# Patient Record
Sex: FEMALE | Race: WHITE | NOT HISPANIC OR LATINO | Employment: FULL TIME | URBAN - METROPOLITAN AREA
[De-identification: names, ages, dates, MRNs, and addresses within clinical notes are randomized per-mention and may not be internally consistent; named-entity substitution may affect disease eponyms.]

---

## 2023-05-30 ENCOUNTER — TELEMEDICINE (OUTPATIENT)
Dept: PRIMARY CARE | Facility: CLINIC | Age: 38
End: 2023-05-30
Payer: COMMERCIAL

## 2023-05-30 DIAGNOSIS — K50.919 CROHN'S DISEASE WITH COMPLICATION, UNSPECIFIED GASTROINTESTINAL TRACT LOCATION (MULTI): ICD-10-CM

## 2023-05-30 DIAGNOSIS — U07.1 COVID-19: ICD-10-CM

## 2023-05-30 DIAGNOSIS — U07.1 COVID-19: Primary | ICD-10-CM

## 2023-05-30 PROBLEM — R56.9 SEIZURE (MULTI): Status: ACTIVE | Noted: 2023-05-30

## 2023-05-30 PROBLEM — K50.90 CROHN'S RELATED ARTHRITIS (MULTI): Status: ACTIVE | Noted: 2023-05-30

## 2023-05-30 PROBLEM — F41.9 ANXIETY: Status: ACTIVE | Noted: 2023-05-30

## 2023-05-30 PROBLEM — E53.8 VITAMIN B12 DEFICIENCY: Status: ACTIVE | Noted: 2023-05-30

## 2023-05-30 PROBLEM — F98.8 ADD (ATTENTION DEFICIT DISORDER): Status: ACTIVE | Noted: 2023-05-30

## 2023-05-30 PROBLEM — M07.60 CROHN'S RELATED ARTHRITIS (MULTI): Status: ACTIVE | Noted: 2023-05-30

## 2023-05-30 PROBLEM — I95.1 DYSAUTONOMIA ORTHOSTATIC HYPOTENSION SYNDROME: Status: ACTIVE | Noted: 2023-05-30

## 2023-05-30 PROCEDURE — 99213 OFFICE O/P EST LOW 20 MIN: CPT | Performed by: FAMILY MEDICINE

## 2023-05-30 RX ORDER — LAMOTRIGINE 150 MG/1
1 TABLET ORAL DAILY
COMMUNITY

## 2023-05-30 RX ORDER — ARIPIPRAZOLE 400 MG
400 KIT INTRAMUSCULAR
COMMUNITY
Start: 2023-05-04

## 2023-05-30 ASSESSMENT — ENCOUNTER SYMPTOMS
SHORTNESS OF BREATH: 1
CHILLS: 0
RHINORRHEA: 1
SWEATS: 0
MYALGIAS: 0
HEADACHES: 0
COUGH: 1
FEVER: 1
HEARTBURN: 0
HEMOPTYSIS: 0
WEIGHT LOSS: 0
SORE THROAT: 0
WHEEZING: 0

## 2023-05-30 NOTE — PROGRESS NOTES
Subjective   Patient ID: Erendira Ruiz is a 37 y.o. female who presents for covid 19.    Got sick 2 days ago   went to ER and gave cough med  Reviewed note   This visit was complete by telephone due to the restrictions of the COVID-19 pandemic and patient unable to complete using video. Consent was given for the visit. The patients identity was confirmed.  All issues as below were discussed and addressed but no physical exam was performed. If it was felt that the patient should be evaluated in the clinic then they were directed there. The patient verbally consented to visit. Spent 10  minutes with patient and 15  minutes total.       Cough  This is a new problem. The current episode started in the past 7 days. The problem has been unchanged. The problem occurs constantly. The cough is Non-productive. Associated symptoms include chest pain, a fever, nasal congestion, rhinorrhea and shortness of breath. Pertinent negatives include no chills, ear congestion, ear pain, headaches, heartburn, hemoptysis, myalgias, postnasal drip, rash, sore throat, sweats, weight loss or wheezing. Nothing aggravates the symptoms.        Review of Systems   Constitutional:  Positive for fever. Negative for chills and weight loss.   HENT:  Positive for rhinorrhea. Negative for ear pain, postnasal drip and sore throat.    Respiratory:  Positive for cough and shortness of breath. Negative for hemoptysis and wheezing.    Cardiovascular:  Positive for chest pain.   Gastrointestinal:  Negative for heartburn.   Musculoskeletal:  Negative for myalgias.   Skin:  Negative for rash.   Neurological:  Negative for headaches.       Objective   There were no vitals taken for this visit.    Physical Exam    Assessment/Plan   Problem List Items Addressed This Visit    None

## 2023-06-09 ENCOUNTER — OFFICE VISIT (OUTPATIENT)
Dept: PRIMARY CARE | Facility: CLINIC | Age: 38
End: 2023-06-09
Payer: COMMERCIAL

## 2023-06-09 VITALS
HEIGHT: 64 IN | SYSTOLIC BLOOD PRESSURE: 90 MMHG | DIASTOLIC BLOOD PRESSURE: 56 MMHG | BODY MASS INDEX: 28.42 KG/M2 | TEMPERATURE: 97.2 F | RESPIRATION RATE: 20 BRPM | WEIGHT: 166.5 LBS | HEART RATE: 78 BPM

## 2023-06-09 DIAGNOSIS — R56.9 SEIZURE (MULTI): ICD-10-CM

## 2023-06-09 DIAGNOSIS — F31.9 BIPOLAR I DISORDER (MULTI): ICD-10-CM

## 2023-06-09 DIAGNOSIS — J01.90 ACUTE NON-RECURRENT SINUSITIS, UNSPECIFIED LOCATION: Primary | ICD-10-CM

## 2023-06-09 DIAGNOSIS — I95.1 DYSAUTONOMIA ORTHOSTATIC HYPOTENSION SYNDROME: ICD-10-CM

## 2023-06-09 PROCEDURE — 99213 OFFICE O/P EST LOW 20 MIN: CPT | Performed by: FAMILY MEDICINE

## 2023-06-09 PROCEDURE — 1036F TOBACCO NON-USER: CPT | Performed by: FAMILY MEDICINE

## 2023-06-09 RX ORDER — AMOXICILLIN 500 MG/1
1000 TABLET, FILM COATED ORAL EVERY 12 HOURS
Qty: 40 TABLET | Refills: 0 | Status: SHIPPED | OUTPATIENT
Start: 2023-06-09 | End: 2023-06-19

## 2023-06-09 ASSESSMENT — ENCOUNTER SYMPTOMS: COUGH: 1

## 2023-06-09 NOTE — PROGRESS NOTES
"Subjective   Patient ID: Erendira Ruiz is a 37 y.o. female who presents for Cough (With congestion, s/p covid 11 days ago).    Occasional green  PND     URI   This is a new problem. The current episode started 1 to 4 weeks ago. The problem has been waxing and waning. There has been no fever. The fever has been present for 1 to 2 days. Associated symptoms include congestion and coughing.        Review of Systems   HENT:  Positive for congestion.    Respiratory:  Positive for cough.        Objective   BP 90/56 (BP Location: Right arm, Patient Position: Sitting, BP Cuff Size: Adult)   Pulse 78   Temp 36.2 °C (97.2 °F) (Temporal)   Resp 20   Ht 1.626 m (5' 4\")   Wt 75.5 kg (166 lb 8 oz)   BMI 28.58 kg/m²     Physical Exam  Vitals and nursing note reviewed.   Constitutional:       General: She is not in acute distress.     Appearance: Normal appearance. She is not ill-appearing.   HENT:      Head: Normocephalic and atraumatic.      Right Ear: Tympanic membrane, ear canal and external ear normal. Tympanic membrane is not perforated, erythematous, retracted or bulging.      Left Ear: Tympanic membrane, ear canal and external ear normal. Tympanic membrane is not perforated, erythematous, retracted or bulging.      Nose: Congestion present.      Mouth/Throat:      Mouth: Mucous membranes are moist.      Pharynx: Oropharynx is clear.   Eyes:      General:         Right eye: No hordeolum.         Left eye: No hordeolum.      Conjunctiva/sclera: Conjunctivae normal.      Right eye: Right conjunctiva is not injected. No exudate.     Left eye: Left conjunctiva is not injected. No exudate.  Cardiovascular:      Rate and Rhythm: Normal rate and regular rhythm.   Pulmonary:      Effort: Pulmonary effort is normal. No tachypnea.      Breath sounds: Normal breath sounds. No decreased air movement.   Lymphadenopathy:      Cervical:      Right cervical: No superficial cervical adenopathy.     Left cervical: No superficial " cervical adenopathy.   Skin:     General: Skin is warm.      Findings: No rash.   Neurological:      Mental Status: She is alert.   Psychiatric:         Mood and Affect: Mood and affect normal.         Assessment/Plan   Problem List Items Addressed This Visit          Nervous    Seizure (CMS/HCC)     Stable and will monitor at least yearly.  No recent seizure             Other    Bipolar I disorder (CMS/HCC) (Chronic)     Seeing psych         Dysautonomia orthostatic hypotension syndrome (CMS/HCC)     Stable and will monitor at least yearly.

## 2023-12-12 ENCOUNTER — OFFICE VISIT (OUTPATIENT)
Dept: PRIMARY CARE | Facility: CLINIC | Age: 38
End: 2023-12-12
Payer: COMMERCIAL

## 2023-12-12 VITALS
HEART RATE: 78 BPM | RESPIRATION RATE: 16 BRPM | BODY MASS INDEX: 30.05 KG/M2 | SYSTOLIC BLOOD PRESSURE: 114 MMHG | WEIGHT: 176 LBS | TEMPERATURE: 97.8 F | HEIGHT: 64 IN | DIASTOLIC BLOOD PRESSURE: 70 MMHG

## 2023-12-12 DIAGNOSIS — J18.9 PNEUMONIA OF LEFT LOWER LOBE DUE TO INFECTIOUS ORGANISM: Primary | ICD-10-CM

## 2023-12-12 PROCEDURE — 99212 OFFICE O/P EST SF 10 MIN: CPT | Performed by: FAMILY MEDICINE

## 2023-12-12 PROCEDURE — 1036F TOBACCO NON-USER: CPT | Performed by: FAMILY MEDICINE

## 2023-12-12 NOTE — PROGRESS NOTES
"Subjective   Patient ID: Erendira Ruiz is a 38 y.o. female who presents for Flu Symptoms (Patient here for F/U from [facility] for flu symptoms. Patient was there last week and then a couple days ago for pneumonia.  ) and Pneumonia.    Got sick 2 weeks ago with the flu, no feverr past week    went to ER on Sunday and chest xray shows atelectasis vs early pneumonia and is feeling better   got z wolfgang   Is feeling better          Review of Systems    Objective   /70   Pulse 78   Temp 36.6 °C (97.8 °F)   Resp 16   Ht 1.626 m (5' 4\")   Wt 79.8 kg (176 lb)   BMI 30.21 kg/m²     Physical Exam  Vitals and nursing note reviewed.   Constitutional:       General: She is not in acute distress.     Appearance: Normal appearance. She is not ill-appearing.   HENT:      Head: Normocephalic and atraumatic.      Right Ear: Tympanic membrane, ear canal and external ear normal. Tympanic membrane is not perforated, erythematous, retracted or bulging.      Left Ear: Tympanic membrane, ear canal and external ear normal. Tympanic membrane is not perforated, erythematous, retracted or bulging.      Mouth/Throat:      Mouth: Mucous membranes are moist.      Pharynx: Oropharynx is clear.   Eyes:      General:         Right eye: No hordeolum.         Left eye: No hordeolum.      Conjunctiva/sclera: Conjunctivae normal.      Right eye: Right conjunctiva is not injected. No exudate.     Left eye: Left conjunctiva is not injected. No exudate.  Cardiovascular:      Rate and Rhythm: Normal rate and regular rhythm.   Pulmonary:      Effort: Pulmonary effort is normal. No tachypnea.      Breath sounds: Normal breath sounds. No decreased air movement.   Lymphadenopathy:      Cervical:      Right cervical: No superficial cervical adenopathy.     Left cervical: No superficial cervical adenopathy.   Skin:     General: Skin is warm.      Findings: No rash.   Neurological:      Mental Status: She is alert.   Psychiatric:         Mood and " Affect: Mood and affect normal.         Assessment/Plan   Problem List Items Addressed This Visit    None  Visit Diagnoses         Codes    Pneumonia of left lower lobe due to infectious organism    -  Primary J18.9

## 2024-10-08 ENCOUNTER — APPOINTMENT (OUTPATIENT)
Dept: PRIMARY CARE | Facility: CLINIC | Age: 39
End: 2024-10-08
Payer: COMMERCIAL

## 2024-10-08 VITALS
DIASTOLIC BLOOD PRESSURE: 74 MMHG | WEIGHT: 178 LBS | BODY MASS INDEX: 29.66 KG/M2 | TEMPERATURE: 97.5 F | SYSTOLIC BLOOD PRESSURE: 110 MMHG | HEIGHT: 65 IN | RESPIRATION RATE: 16 BRPM | OXYGEN SATURATION: 98 % | HEART RATE: 76 BPM

## 2024-10-08 DIAGNOSIS — F31.9 BIPOLAR I DISORDER (MULTI): Primary | Chronic | ICD-10-CM

## 2024-10-08 DIAGNOSIS — Z23 NEED FOR INFLUENZA VACCINATION: ICD-10-CM

## 2024-10-08 DIAGNOSIS — Z76.89 ENCOUNTER TO ESTABLISH CARE: ICD-10-CM

## 2024-10-08 DIAGNOSIS — F42.2 MIXED OBSESSIONAL THOUGHTS AND ACTS: ICD-10-CM

## 2024-10-08 DIAGNOSIS — F22 PARANOIA (MULTI): ICD-10-CM

## 2024-10-08 DIAGNOSIS — K50.819 CROHN'S DISEASE OF SMALL AND LARGE INTESTINES WITH COMPLICATION (MULTI): ICD-10-CM

## 2024-10-08 PROBLEM — N83.202 OVARIAN CYST, LEFT: Status: ACTIVE | Noted: 2022-06-14

## 2024-10-08 PROBLEM — R56.9 SEIZURE (MULTI): Status: RESOLVED | Noted: 2023-05-30 | Resolved: 2024-10-08

## 2024-10-08 PROBLEM — Z93.9 HISTORY OF CREATION OF OSTOMY (MULTI): Status: RESOLVED | Noted: 2024-10-08 | Resolved: 2024-10-08

## 2024-10-08 PROBLEM — I95.1 DYSAUTONOMIA ORTHOSTATIC HYPOTENSION SYNDROME: Status: RESOLVED | Noted: 2023-05-30 | Resolved: 2024-10-08

## 2024-10-08 PROCEDURE — 1036F TOBACCO NON-USER: CPT | Performed by: NURSE PRACTITIONER

## 2024-10-08 PROCEDURE — 90471 IMMUNIZATION ADMIN: CPT | Performed by: NURSE PRACTITIONER

## 2024-10-08 PROCEDURE — 90656 IIV3 VACC NO PRSV 0.5 ML IM: CPT | Performed by: NURSE PRACTITIONER

## 2024-10-08 PROCEDURE — 99214 OFFICE O/P EST MOD 30 MIN: CPT | Performed by: NURSE PRACTITIONER

## 2024-10-08 PROCEDURE — 3008F BODY MASS INDEX DOCD: CPT | Performed by: NURSE PRACTITIONER

## 2024-10-08 RX ORDER — LEVONORGESTREL 52 MG/1
1 INTRAUTERINE DEVICE INTRAUTERINE
COMMUNITY

## 2024-10-08 RX ORDER — ACETAMINOPHEN, DEXTROMETHORPHAN HBR, DOXYLAMINE SUCCINATE, PHENYLEPHRINE HCL 650; 20; 12.5; 1 MG/30ML; MG/30ML; MG/30ML; MG/30ML
1 SOLUTION ORAL DAILY
COMMUNITY
Start: 2021-03-29

## 2024-10-08 RX ORDER — ONDANSETRON 8 MG/1
8 TABLET, ORALLY DISINTEGRATING ORAL EVERY 6 HOURS PRN
COMMUNITY
Start: 2024-08-16

## 2024-10-08 ASSESSMENT — ENCOUNTER SYMPTOMS
SHORTNESS OF BREATH: 0
COUGH: 0
FATIGUE: 0
SLEEP DISTURBANCE: 0
NAUSEA: 0
AGITATION: 0
NERVOUS/ANXIOUS: 0
FEVER: 0
CONSTIPATION: 0
DIARRHEA: 0
VOMITING: 0
WEAKNESS: 0

## 2024-10-08 NOTE — PROGRESS NOTES
"Subjective   Patient ID: Erendira Ruiz is a 38 y.o. female who presents for Flu Symptoms (ED follow up , Negative Covid ./Symptoms improved  ) and Establish Care.    She is here for follow up and establish care. She feels much better, she had a URI viral most likely, COVID and influenza negative and she was evaluated in ED. PMH of Bipolar and paranoia, follows with psychiatry. History of chron's and did have an ostomy years ago, follows with GI. Also family history of psychiatric disorders, including bipolar with psychosis.   She is current upset about her medical records stating they are not correct and they keep changing the diagnoses and allergies. She reports she takes her medication as prescribed    Recent blood work and records reviewed         Review of Systems   Constitutional:  Negative for fatigue and fever.   Respiratory:  Negative for cough and shortness of breath.    Cardiovascular:  Negative for chest pain and leg swelling.   Gastrointestinal:  Negative for constipation, diarrhea, nausea and vomiting.   Skin:  Negative for pallor and rash.   Neurological:  Negative for weakness.   Psychiatric/Behavioral:  Negative for agitation, behavioral problems and sleep disturbance. The patient is not nervous/anxious.        Objective   /74 (BP Location: Left arm, Patient Position: Sitting)   Pulse 76   Temp 36.4 °C (97.5 °F)   Resp 16   Ht 1.651 m (5' 5\")   Wt 80.7 kg (178 lb)   SpO2 98%   BMI 29.62 kg/m²     Physical Exam  Vitals and nursing note reviewed.   Constitutional:       General: She is not in acute distress.  HENT:      Head: Normocephalic and atraumatic.   Eyes:      Pupils: Pupils are equal, round, and reactive to light.   Cardiovascular:      Rate and Rhythm: Normal rate and regular rhythm.   Pulmonary:      Effort: Pulmonary effort is normal.      Breath sounds: Normal breath sounds.   Skin:     General: Skin is warm and dry.   Neurological:      Mental Status: She is alert and " oriented to person, place, and time.   Psychiatric:         Mood and Affect: Mood normal.      Comments: Some paranoia thoughts, OCD thoughts         Assessment/Plan   Problem List Items Addressed This Visit             ICD-10-CM    Bipolar I disorder (Multi) - Primary (Chronic) F31.9     Following with psych  Multiple in patient visits in the past  Does live independently and has 3 jobs         OCD (obsessive compulsive disorder) F42.9    Crohn's disease of small and large intestines with complication (Multi) K50.819     Follows with GI         Paranoia (Multi) F22     Other Visit Diagnoses         Codes    Need for influenza vaccination     Z23    Relevant Orders    Flu vaccine, trivalent, preservative free, age 6 months and greater (Fluraix/Fluzone/Flulaval) (Completed)    Encounter to establish care     Z76.89

## 2024-10-08 NOTE — ASSESSMENT & PLAN NOTE
Following with psych  Multiple in patient visits in the past  Does live independently and has 3 jobs

## 2024-12-11 ENCOUNTER — APPOINTMENT (OUTPATIENT)
Dept: PRIMARY CARE | Facility: CLINIC | Age: 39
End: 2024-12-11
Payer: COMMERCIAL

## 2024-12-18 ENCOUNTER — APPOINTMENT (OUTPATIENT)
Dept: PRIMARY CARE | Facility: CLINIC | Age: 39
End: 2024-12-18
Payer: COMMERCIAL

## 2024-12-18 VITALS
OXYGEN SATURATION: 98 % | SYSTOLIC BLOOD PRESSURE: 120 MMHG | HEART RATE: 64 BPM | BODY MASS INDEX: 30.19 KG/M2 | HEIGHT: 64 IN | DIASTOLIC BLOOD PRESSURE: 80 MMHG | TEMPERATURE: 97.2 F | WEIGHT: 176.8 LBS | RESPIRATION RATE: 18 BRPM

## 2024-12-18 DIAGNOSIS — N30.00 ACUTE CYSTITIS WITHOUT HEMATURIA: Primary | ICD-10-CM

## 2024-12-18 DIAGNOSIS — J06.9 VIRAL UPPER RESPIRATORY TRACT INFECTION: ICD-10-CM

## 2024-12-18 PROCEDURE — 3008F BODY MASS INDEX DOCD: CPT | Performed by: NURSE PRACTITIONER

## 2024-12-18 PROCEDURE — 1036F TOBACCO NON-USER: CPT | Performed by: NURSE PRACTITIONER

## 2024-12-18 PROCEDURE — 99214 OFFICE O/P EST MOD 30 MIN: CPT | Performed by: NURSE PRACTITIONER

## 2024-12-18 ASSESSMENT — ENCOUNTER SYMPTOMS
CONSTIPATION: 0
NAUSEA: 0
FEVER: 0
DIARRHEA: 0
SHORTNESS OF BREATH: 0
HEADACHES: 0
WEAKNESS: 0
COUGH: 1
FLANK PAIN: 0
NERVOUS/ANXIOUS: 0
DYSURIA: 0
VOMITING: 0
AGITATION: 0
SLEEP DISTURBANCE: 0
DIFFICULTY URINATING: 0
FATIGUE: 0

## 2024-12-18 NOTE — PROGRESS NOTES
"Subjective   Patient ID: Erendira Ruiz is a 39 y.o. female who presents for Follow-up (UTI, Recovering from UTI).    She was told to follow up with her PCP, she was treated with ATB for UTI and no resolved. She has a mild URI (viral), mild cough , no fever.           Review of Systems   Constitutional:  Negative for fatigue and fever.   HENT:  Positive for congestion.    Respiratory:  Positive for cough. Negative for shortness of breath.    Cardiovascular:  Negative for chest pain and leg swelling.   Gastrointestinal:  Negative for constipation, diarrhea, nausea and vomiting.   Genitourinary:  Negative for difficulty urinating, dysuria, flank pain and urgency.   Skin:  Negative for pallor and rash.   Neurological:  Negative for weakness and headaches.   Psychiatric/Behavioral:  Negative for agitation, behavioral problems and sleep disturbance. The patient is not nervous/anxious.        Objective   /80   Pulse 64   Temp 36.2 °C (97.2 °F)   Resp 18   Ht 1.626 m (5' 4\")   Wt 80.2 kg (176 lb 12.8 oz)   SpO2 98%   BMI 30.35 kg/m²     Physical Exam  Vitals and nursing note reviewed.   Constitutional:       General: She is not in acute distress.  HENT:      Head: Normocephalic and atraumatic.   Eyes:      Pupils: Pupils are equal, round, and reactive to light.   Cardiovascular:      Rate and Rhythm: Normal rate and regular rhythm.   Pulmonary:      Effort: Pulmonary effort is normal.      Breath sounds: Normal breath sounds.   Skin:     General: Skin is warm and dry.   Neurological:      Mental Status: She is alert and oriented to person, place, and time.   Psychiatric:         Mood and Affect: Mood normal.         Assessment/Plan   Problem List Items Addressed This Visit    None  Visit Diagnoses         Codes    Acute cystitis without hematuria    -  Primary N30.00    resolved     Viral upper respiratory tract infection     J06.9    OTC symptom control  rest and fluids                "

## 2025-01-15 ENCOUNTER — OFFICE VISIT (OUTPATIENT)
Dept: URGENT CARE | Age: 40
End: 2025-01-15
Payer: COMMERCIAL

## 2025-01-15 ENCOUNTER — APPOINTMENT (OUTPATIENT)
Dept: PRIMARY CARE | Facility: CLINIC | Age: 40
End: 2025-01-15
Payer: COMMERCIAL

## 2025-01-15 VITALS
TEMPERATURE: 98.3 F | HEART RATE: 80 BPM | SYSTOLIC BLOOD PRESSURE: 119 MMHG | RESPIRATION RATE: 16 BRPM | OXYGEN SATURATION: 97 % | DIASTOLIC BLOOD PRESSURE: 72 MMHG

## 2025-01-15 DIAGNOSIS — H10.9 CONJUNCTIVITIS, UNSPECIFIED CONJUNCTIVITIS TYPE, UNSPECIFIED LATERALITY: Primary | ICD-10-CM

## 2025-01-15 PROCEDURE — 1036F TOBACCO NON-USER: CPT | Performed by: PHYSICIAN ASSISTANT

## 2025-01-15 PROCEDURE — 99203 OFFICE O/P NEW LOW 30 MIN: CPT | Performed by: PHYSICIAN ASSISTANT

## 2025-01-15 NOTE — PROGRESS NOTES
Subjective   Patient ID: Erendira Ruiz is a 39 y.o. female. They present today with a chief complaint of Left infection x 2 days (With burning and watery sensation per pt.).    CC: Concern for pinkeye    HPI: Patient presenting for left eye burning and watery discharge.  Symptoms started yesterday but have resolved by this morning.  No foreign body sensation.  No runny nose, coughing or congestion.  No fever.    No visual disturbance.  No pain with extraocular motion.  No chemical irritants or trauma to the eyes.   Patient does wear glasses.    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 She has never used smokeless tobacco. She reports current alcohol use. She reports that she does not use drugs.    Review of Systems  Review of Systems    After reviewing all body systems I have documented pertinent findings above in the history.  All other Systems reviewed and are negative for complaint.  Pertinent positive and negatives are listed in the above HPI.    Objective    Vitals:    01/15/25 1118   BP: 119/72   Pulse: 80   Resp: 16    Temp: 36.8 °C (98.3 °F)   SpO2: 97%     Patient's last menstrual period was 01/14/2025.    Physical Exam    General: Alert, oriented, and cooperative.  No acute distress. Well developed, well nourished.     Skin: Skin is warm, and dry. No rashes or lesions.    Eyes: Bilateral Sclera and conjunctivae normal. PERRLA. EOMI and visual fields are intact. No limitation of movements, nystagmus, strabismus, anisocoria, or ptosis. Eyelids, lashes, and lacrimals are normal. No periorbital step off, edema, or tenderness.  No exophthalmos. No gross hyphema, or hypopyon. No steamy cornea. No limbic flush. No peaked pupil. No bloody chemosis. No subconjunctival hemorrhage. No foreign body visualized. No lacrimal erythema, edema, tenderness, abrasion, or laceration.     Visual Acuity: Normal    Ears: TM´s are intact, grey, with good cone of light.  No hemotympanum or rupture. Bilateral auricle, helix, and tragus without inflammation or erythema.  No mastoid erythema, or tenderness.  No deformities. Right and left canal negative for erythema, or discharge.  Hearing is grossly intact bilaterally    Mouth/Throat: Pharynx is erythematous.  Tonsils are equal in size.  No exudates.  No angioedema of the lips or tongue.  No respiratory compromise, tripoding, drooling, muffled voice,  stridor, or trismus.     Neck: Supple.     Cardiac: Regular rate     Respiratory:  No acute respiratory distress.  Regular rate of breathing.  No accessory muscle use.  No tripoding.        Procedures    Point of Care Test & Imaging Results from this visit    No results found.    Diagnostic study results (if any) were reviewed by [facility].    Assessment/Plan   Allergies, medications, history, and pertinent labs/EKGs/Imaging reviewed by Breezy De La Rosa PA-C.       MDM:  Patient presenting for a nonpainful, watery discharge from the left eye.  She initially had mild burning.  Symptom onset yesterday.  No symptoms today.  No cold  symptoms.    Examination of the  Bilateral eyes are unremarkable.    No clinical findings to suggest such as hyperacute conjunctivitis, iritis, glaucoma, foreign body, corneal abrasion, ulcer, herpetic causes, globe rupture, periorbital, orbital cellulitis, or Kawasaki.     Counseled patient/parent regarding the diagnosis and findings.   Patient/parent advised to follow-up with PCP in 2-3 days.  Patient/parent advised returning or going to the emergency department for new or worsening symptoms.      Orders and Diagnoses  Diagnoses and all orders for this visit:  Conjunctivitis, unspecified conjunctivitis type, unspecified laterality      Medical Admin Record      Patient disposition: Home    Electronically signed by [facility]  11:37 AM

## 2025-01-15 NOTE — PATIENT INSTRUCTIONS
Conjunctivitis    Plan:  1. Discharge home.  2. Continue to monitor symptoms.  Consider taking antihistamine for symptomatic management.  Consider taking Clear Eyes.  3.   -Use hand  or wash hands properly (scrub hands for at least 20 seconds, rinse well, and turn off the water using a paper towel)   -avoid touching the noninfected eye after touching the infected eye, avoid sharing towels or pillows, and avoid swimming in pools.   -Do not patch the eye  -Contact lenses should be avoided until symptoms fully resolve.  If disposable, they should be thrown out.  -Small children with conjunctivitis should be kept home from school to avoid spread. People should avoid others until their symptoms clear, as it is very contagious when caused by an infection. Most people are safe to go back to work, school, or  once symptoms have cleared.   4. Follow up with  your PCP or return to the urgent care for continued, new or worsening symptoms  5. Return to ED immediately if symptoms worsen or if new symptoms develop.

## 2025-01-16 ENCOUNTER — APPOINTMENT (OUTPATIENT)
Dept: PRIMARY CARE | Facility: CLINIC | Age: 40
End: 2025-01-16
Payer: COMMERCIAL